# Patient Record
Sex: MALE | Race: WHITE | HISPANIC OR LATINO
[De-identification: names, ages, dates, MRNs, and addresses within clinical notes are randomized per-mention and may not be internally consistent; named-entity substitution may affect disease eponyms.]

---

## 2017-05-11 ENCOUNTER — LABORATORY RESULT (OUTPATIENT)
Age: 62
End: 2017-05-11

## 2017-05-11 ENCOUNTER — APPOINTMENT (OUTPATIENT)
Dept: HEART AND VASCULAR | Facility: CLINIC | Age: 62
End: 2017-05-11

## 2017-05-11 VITALS — SYSTOLIC BLOOD PRESSURE: 140 MMHG | DIASTOLIC BLOOD PRESSURE: 80 MMHG | RESPIRATION RATE: 16 BRPM | HEART RATE: 88 BPM

## 2017-05-11 VITALS — BODY MASS INDEX: 25.77 KG/M2 | WEIGHT: 180 LBS | HEIGHT: 70 IN

## 2017-05-11 DIAGNOSIS — E11.9 TYPE 2 DIABETES MELLITUS W/OUT COMPLICATIONS: ICD-10-CM

## 2017-05-11 DIAGNOSIS — I20.0 UNSTABLE ANGINA: ICD-10-CM

## 2017-05-11 DIAGNOSIS — Z87.891 PERSONAL HISTORY OF NICOTINE DEPENDENCE: ICD-10-CM

## 2017-05-11 DIAGNOSIS — I10 ESSENTIAL (PRIMARY) HYPERTENSION: ICD-10-CM

## 2017-05-11 RX ORDER — ASPIRIN 81 MG
81 TABLET, DELAYED RELEASE (ENTERIC COATED) ORAL
Refills: 0 | Status: ACTIVE | COMMUNITY

## 2017-05-11 RX ORDER — METOPROLOL SUCCINATE 25 MG/1
25 TABLET, EXTENDED RELEASE ORAL
Refills: 0 | Status: ACTIVE | COMMUNITY

## 2017-05-11 RX ORDER — INSULIN ASPART 100 [IU]/ML
100 INJECTION, SOLUTION INTRAVENOUS; SUBCUTANEOUS
Refills: 0 | Status: ACTIVE | COMMUNITY

## 2017-05-11 RX ORDER — INSULIN GLARGINE 100 [IU]/ML
100 INJECTION, SOLUTION SUBCUTANEOUS
Refills: 0 | Status: ACTIVE | COMMUNITY

## 2017-05-12 VITALS
WEIGHT: 178.57 LBS | DIASTOLIC BLOOD PRESSURE: 79 MMHG | SYSTOLIC BLOOD PRESSURE: 113 MMHG | RESPIRATION RATE: 18 BRPM | HEART RATE: 84 BPM | OXYGEN SATURATION: 98 % | HEIGHT: 70 IN

## 2017-05-12 LAB
ALBUMIN SERPL ELPH-MCNC: 3.6 G/DL
ALP BLD-CCNC: 171 U/L
ALT SERPL-CCNC: 85 U/L
ANION GAP SERPL CALC-SCNC: 13 MMOL/L
AST SERPL-CCNC: 109 U/L
BASOPHILS # BLD AUTO: 0.03 K/UL
BASOPHILS NFR BLD AUTO: 0.9 %
BILIRUB SERPL-MCNC: 1.8 MG/DL
BUN SERPL-MCNC: 20 MG/DL
CALCIUM SERPL-MCNC: 9.2 MG/DL
CHLORIDE SERPL-SCNC: 104 MMOL/L
CHOLEST SERPL-MCNC: 191 MG/DL
CHOLEST/HDLC SERPL: 3 RATIO
CO2 SERPL-SCNC: 21 MMOL/L
CREAT SERPL-MCNC: 0.79 MG/DL
EOSINOPHIL # BLD AUTO: 0.3 K/UL
EOSINOPHIL NFR BLD AUTO: 9.3 %
GLUCOSE SERPL-MCNC: 179 MG/DL
HCT VFR BLD CALC: 44.2 %
HDLC SERPL-MCNC: 62 MG/DL
HGB BLD-MCNC: 15.2 G/DL
IMM GRANULOCYTES NFR BLD AUTO: 0.3 %
INR PPP: 1.14 RATIO
LDLC SERPL CALC-MCNC: 104 MG/DL
LYMPHOCYTES # BLD AUTO: 0.92 K/UL
LYMPHOCYTES NFR BLD AUTO: 28.5 %
MAN DIFF?: NORMAL
MCHC RBC-ENTMCNC: 30.6 PG
MCHC RBC-ENTMCNC: 34.4 GM/DL
MCV RBC AUTO: 88.9 FL
MONOCYTES # BLD AUTO: 0.52 K/UL
MONOCYTES NFR BLD AUTO: 16.1 %
NEUTROPHILS # BLD AUTO: 1.45 K/UL
NEUTROPHILS NFR BLD AUTO: 44.9 %
PLATELET # BLD AUTO: 94 K/UL
POTASSIUM SERPL-SCNC: 4.3 MMOL/L
PROT SERPL-MCNC: 7.2 G/DL
PT BLD: 12.9 SEC
RBC # BLD: 4.97 M/UL
RBC # FLD: 16 %
SODIUM SERPL-SCNC: 138 MMOL/L
TRIGL SERPL-MCNC: 124 MG/DL
WBC # FLD AUTO: 3.23 K/UL

## 2017-05-12 RX ORDER — INSULIN GLARGINE 100 [IU]/ML
0 INJECTION, SOLUTION SUBCUTANEOUS
Qty: 0 | Refills: 0 | COMMUNITY

## 2017-05-12 RX ORDER — INSULIN ASPART 100 [IU]/ML
0 INJECTION, SOLUTION SUBCUTANEOUS
Qty: 0 | Refills: 0 | COMMUNITY

## 2017-05-12 RX ORDER — INSULIN ASPART 100 [IU]/ML
15 INJECTION, SOLUTION SUBCUTANEOUS
Qty: 0 | Refills: 0 | COMMUNITY

## 2017-05-12 RX ORDER — METOPROLOL TARTRATE 50 MG
1 TABLET ORAL
Qty: 0 | Refills: 0 | COMMUNITY

## 2017-05-12 NOTE — H&P ADULT - HISTORY OF PRESENT ILLNESS
61 y o m with FMH of CAD with PMHs of HTN, DM, prior CAD, s/p CABG over 7 years ago (report to be obtained) comes c/o recurrent chest pains that comes spontaneously  and is described a heaviness that radiates to his back. As per MD note, the pain has been waking him at night. Pt denies SOB, PND/orthopnea, palpitations or syncope.     In light of pt's risk factors, prior CAD and CCS 4 anginal symptoms, pt is now referred to [facility] for cardiac cath with possible intervention to rule out underlying CAD. 61 y o m HEPATITIS C INFECTED former smoker (quit 20 y ago, 1 PPD) with FMH of CAD with PMHs of HTN, DM, prior CAD, s/p CABG over 7 years ago in [facility] (report to be obtained) comes c/o recurrent chest pains that comes spontaneously  and is described a heaviness that radiates to his back.The pain has been waking him at night. Pt claims that he was feeling nauseous in the last couple of days. Pt denies SOB, PND/orthopnea, palpitations or syncope, fever/chills.     In light of pt's risk factors, prior CAD and CCS 4 anginal symptoms, pt is now referred to [facility] for cardiac cath with possible intervention to rule out underlying CAD. 61 y o m HEPATITIS C INFECTED former smoker (quit 20 y ago, 1 PPD) with FMH of CAD with PMHs of HTN, DM, prior CAD, s/p CABG over 7 years ago in [facility] (report to be obtained) comes c/o recurrent chest pains that comes spontaneously  and is described a heaviness that radiates to his back.The pain has been waking him at night. Pt claims that he was feeling nauseous in the last couple of days. Pt denies SOB, PND/orthopnea, palpitations or syncope, fever/chills. Pt denies any recent ECHO or NST done.     In light of pt's risk factors, prior CAD and CCS 4 anginal symptoms, pt is now referred to [facility] for cardiac cath with possible intervention to rule out underlying CAD. 62 y/o m HEPATITIS C INFECTED former smoker (quit 20 y ago, 1 PPD) with FMH of CAD with PMHs of HTN, DM, prior CAD, s/p CABG over 7 years ago in [facility] (report to be obtained) comes c/o recurrent chest pains that comes spontaneously  and is described a heaviness that radiates to his back.The pain has been waking him at night. Pt claims that he was feeling nauseous in the last couple of days. Pt denies SOB, PND/orthopnea, palpitations or syncope, fever/chills. Pt denies any recent ECHO or NST done.     In light of pt's risk factors, prior CAD and CCS 4 anginal symptoms, pt is now referred to [facility] for cardiac cath with possible intervention to rule out underlying CAD. 60 y/o m HEPATITIS C INFECTED former smoker (quit 20 y ago, 1 PPD) with FMH of CAD with PMHs of HTN, DM, prior CAD, s/p CABGx2 (LIMA-> LAD, SVG-> OM1, SVG-> D1) @ [facility] 05/09/2008 comes c/o recurrent chest pains that comes spontaneously  and is described a heaviness that radiates to his back.The pain has been waking him at night. Pt claims that he was feeling nauseous in the last couple of days. Pt denies SOB, PND/orthopnea, palpitations or syncope, fever/chills. Pt denies any recent ECHO or NST done.     In light of pt's risk factors, prior CAD and CCS 4 anginal symptoms, pt is now referred to [facility] for cardiac cath with possible intervention to rule out underlying CAD.

## 2017-05-12 NOTE — H&P ADULT - FAMILY HISTORY
Mother  Still living? Unknown  Family history of heart attack, Age at diagnosis: Age Unknown  Family history of diabetes mellitus, Age at diagnosis: Age Unknown

## 2017-05-12 NOTE — H&P ADULT - PMH
CAD (coronary artery disease) of artery bypass graft    Diabetes    Hypertension CAD (coronary artery disease) of artery bypass graft    Diabetes    Hepatitis C    Hypertension

## 2017-05-12 NOTE — H&P ADULT - NSHPSOCIALHISTORY_GEN_ALL_CORE
Pt quit smoking (20 years ago. 1PPD), pt denies using ETOH or doing any types of recreational drugs.

## 2017-05-12 NOTE — H&P ADULT - NSHPLABSRESULTS_GEN_ALL_CORE
EKG- NSR @84 BPM EKG- NSR @84 BPM                              14.7   2.1   )-----------( 87       ( 15 May 2017 09:13 )             42.0       05-15    139  |  108  |  20  ----------------------------<  310<H>  4.0   |  26  |  0.65    Ca    8.4<L>      15 May 2017 09:13    TPro  7.1  /  Alb  3.0<L>  /  TBili  1.4<H>  /  DBili  x   /  AST  106<H>  /  ALT  96<H>  /  AlkPhos  169<H>  05-15          CARDIAC MARKERS ( 15 May 2017 09:13 )  x     / x     / 68 U/L / x     / <1.0 ng/mL

## 2017-05-12 NOTE — H&P ADULT - ASSESSMENT
62 y/o m HEPATITIS C INFECTED former smoker (quit 20 y ago, 1 PPD) with FMH of CAD with PMHs of HTN, DM, prior CAD, s/p CABG over 7 years ago in [facility] (report to be obtained) with CCS 4 anginal symptoms, who is now referred to [facility] for cardiac cath with possible intervention to rule out underlying CAD.    Pt took his ASA 81mg PO at home.     ASA 3, Mallampati 2 60 y/o m HEPATITIS C INFECTED former smoker (quit 20 y ago, 1 PPD) with FMH of CAD with PMHs of HTN, DM, prior CAD, s/p CABG over 7 years ago in [facility] (report to be obtained) with CCS 4 anginal symptoms, who is now referred to [facility] for cardiac cath with possible intervention to rule out underlying CAD.    Pt took his ASA 81mg PO at home. Platelets are 87 today, secondary to hepatic impairment (Hep C). Dr. Ramos is aware. As discussed with Dr. Ramos, ASA 243mg PO and Plavix 600mg PO given prior to cardiac cath.    ASA 3, Mallampati 2 62 y/o m HEPATITIS C INFECTED former smoker (quit 20 y ago, 1 PPD) with FMH of CAD with PMHs of HTN, DM, prior CAD, s/p CABG over 7 years ago in [facility] (report to be obtained) with CCS 4 anginal symptoms, who is now referred to [facility] for cardiac cath with possible intervention to rule out underlying CAD.    Pt took his ASA 81mg PO at home. Platelets are 87 today, and LFTs are elevated (, ALT  96, AlkPhos 169) secondary to hepatic impairment (Hep C). Dr. Ramos is aware. As discussed with Dr. Ramos, ASA 243mg PO and Plavix 600mg PO given prior to cardiac cath.      ASA 3, Mallampati 2

## 2017-05-15 ENCOUNTER — INPATIENT (INPATIENT)
Facility: HOSPITAL | Age: 62
LOS: 0 days | Discharge: HOME | DRG: 247 | End: 2017-05-16
Attending: INTERNAL MEDICINE | Admitting: INTERNAL MEDICINE
Payer: COMMERCIAL

## 2017-05-15 DIAGNOSIS — Z95.1 PRESENCE OF AORTOCORONARY BYPASS GRAFT: Chronic | ICD-10-CM

## 2017-05-15 LAB
ALBUMIN SERPL ELPH-MCNC: 3 G/DL — LOW (ref 3.4–5)
ALP SERPL-CCNC: 169 U/L — HIGH (ref 40–120)
ALT FLD-CCNC: 96 U/L — HIGH (ref 12–42)
ANION GAP SERPL CALC-SCNC: 5 MMOL/L — LOW (ref 9–16)
APTT BLD: 34.3 SEC — SIGNIFICANT CHANGE UP (ref 27.5–37.4)
AST SERPL-CCNC: 106 U/L — HIGH (ref 15–37)
BASOPHILS NFR BLD AUTO: 0.5 % — SIGNIFICANT CHANGE UP (ref 0–2)
BILIRUB SERPL-MCNC: 1.4 MG/DL — HIGH (ref 0.2–1.2)
BUN SERPL-MCNC: 20 MG/DL — SIGNIFICANT CHANGE UP (ref 7–23)
CALCIUM SERPL-MCNC: 8.4 MG/DL — LOW (ref 8.5–10.5)
CHLORIDE SERPL-SCNC: 108 MMOL/L — SIGNIFICANT CHANGE UP (ref 96–108)
CHOLEST SERPL-MCNC: 175 MG/DL — SIGNIFICANT CHANGE UP
CK MB CFR SERPL CALC: <1 NG/ML — SIGNIFICANT CHANGE UP (ref 0.5–3.6)
CK SERPL-CCNC: 68 U/L — SIGNIFICANT CHANGE UP (ref 39–308)
CO2 SERPL-SCNC: 26 MMOL/L — SIGNIFICANT CHANGE UP (ref 22–31)
CREAT SERPL-MCNC: 0.65 MG/DL — SIGNIFICANT CHANGE UP (ref 0.5–1.3)
CRP SERPL-MCNC: 0.71 MG/DL — HIGH
EOSINOPHIL NFR BLD AUTO: 7.7 % — HIGH (ref 0–6)
GLUCOSE SERPL-MCNC: 310 MG/DL — HIGH (ref 70–99)
HBA1C BLD-MCNC: 8.1 % — HIGH (ref 4.8–5.6)
HCT VFR BLD CALC: 42 % — SIGNIFICANT CHANGE UP (ref 39–50)
HDLC SERPL-MCNC: 56 MG/DL — SIGNIFICANT CHANGE UP
HGB BLD-MCNC: 14.7 G/DL — SIGNIFICANT CHANGE UP (ref 13–17)
INR BLD: 1.31 — HIGH (ref 0.88–1.16)
LIPID PNL WITH DIRECT LDL SERPL: 104 MG/DL — HIGH
LYMPHOCYTES # BLD AUTO: 21.7 % — SIGNIFICANT CHANGE UP (ref 13–44)
MCHC RBC-ENTMCNC: 30.3 PG — SIGNIFICANT CHANGE UP (ref 27–34)
MCHC RBC-ENTMCNC: 35 G/DL — SIGNIFICANT CHANGE UP (ref 32–36)
MCV RBC AUTO: 86.6 FL — SIGNIFICANT CHANGE UP (ref 80–100)
MONOCYTES NFR BLD AUTO: 15.9 % — HIGH (ref 2–14)
NEUTROPHILS NFR BLD AUTO: 54.2 % — SIGNIFICANT CHANGE UP (ref 43–77)
PLATELET # BLD AUTO: 87 K/UL — LOW (ref 150–400)
POTASSIUM SERPL-MCNC: 4 MMOL/L — SIGNIFICANT CHANGE UP (ref 3.5–5.3)
POTASSIUM SERPL-SCNC: 4 MMOL/L — SIGNIFICANT CHANGE UP (ref 3.5–5.3)
PROT SERPL-MCNC: 7.1 G/DL — SIGNIFICANT CHANGE UP (ref 6.4–8.2)
PROTHROM AB SERPL-ACNC: 14.6 SEC — HIGH (ref 9.8–12.7)
RBC # BLD: 4.85 M/UL — SIGNIFICANT CHANGE UP (ref 4.2–5.8)
RBC # FLD: 15.1 % — SIGNIFICANT CHANGE UP (ref 10.3–16.9)
SODIUM SERPL-SCNC: 139 MMOL/L — SIGNIFICANT CHANGE UP (ref 135–145)
TOTAL CHOLESTEROL/HDL RATIO MEASUREMENT: 3.1 RATIO — SIGNIFICANT CHANGE UP
TRIGL SERPL-MCNC: 75 MG/DL — SIGNIFICANT CHANGE UP
WBC # BLD: 2.1 K/UL — LOW (ref 3.8–10.5)
WBC # FLD AUTO: 2.1 K/UL — LOW (ref 3.8–10.5)

## 2017-05-15 PROCEDURE — 93010 ELECTROCARDIOGRAM REPORT: CPT

## 2017-05-15 PROCEDURE — 93459 L HRT ART/GRFT ANGIO: CPT | Mod: 26,XU

## 2017-05-15 PROCEDURE — 92928 PRQ TCAT PLMT NTRAC ST 1 LES: CPT | Mod: LD

## 2017-05-15 RX ORDER — DEXTROSE 50 % IN WATER 50 %
25 SYRINGE (ML) INTRAVENOUS ONCE
Qty: 0 | Refills: 0 | Status: DISCONTINUED | OUTPATIENT
Start: 2017-05-15 | End: 2017-05-16

## 2017-05-15 RX ORDER — ASPIRIN/CALCIUM CARB/MAGNESIUM 324 MG
243 TABLET ORAL ONCE
Qty: 0 | Refills: 0 | Status: COMPLETED | OUTPATIENT
Start: 2017-05-15 | End: 2017-05-15

## 2017-05-15 RX ORDER — CLOPIDOGREL BISULFATE 75 MG/1
600 TABLET, FILM COATED ORAL ONCE
Qty: 0 | Refills: 0 | Status: COMPLETED | OUTPATIENT
Start: 2017-05-15 | End: 2017-05-15

## 2017-05-15 RX ORDER — SODIUM CHLORIDE 9 MG/ML
1000 INJECTION, SOLUTION INTRAVENOUS
Qty: 0 | Refills: 0 | Status: DISCONTINUED | OUTPATIENT
Start: 2017-05-15 | End: 2017-05-15

## 2017-05-15 RX ORDER — GLUCAGON INJECTION, SOLUTION 0.5 MG/.1ML
1 INJECTION, SOLUTION SUBCUTANEOUS ONCE
Qty: 0 | Refills: 0 | Status: DISCONTINUED | OUTPATIENT
Start: 2017-05-15 | End: 2017-05-16

## 2017-05-15 RX ORDER — DEXTROSE 50 % IN WATER 50 %
1 SYRINGE (ML) INTRAVENOUS ONCE
Qty: 0 | Refills: 0 | Status: DISCONTINUED | OUTPATIENT
Start: 2017-05-15 | End: 2017-05-16

## 2017-05-15 RX ORDER — INSULIN LISPRO 100/ML
VIAL (ML) SUBCUTANEOUS
Qty: 0 | Refills: 0 | Status: DISCONTINUED | OUTPATIENT
Start: 2017-05-15 | End: 2017-05-16

## 2017-05-15 RX ORDER — INSULIN GLARGINE 100 [IU]/ML
15 INJECTION, SOLUTION SUBCUTANEOUS AT BEDTIME
Qty: 0 | Refills: 0 | Status: DISCONTINUED | OUTPATIENT
Start: 2017-05-15 | End: 2017-05-16

## 2017-05-15 RX ORDER — CLOPIDOGREL BISULFATE 75 MG/1
75 TABLET, FILM COATED ORAL DAILY
Qty: 0 | Refills: 0 | Status: DISCONTINUED | OUTPATIENT
Start: 2017-05-16 | End: 2017-05-16

## 2017-05-15 RX ORDER — SODIUM CHLORIDE 9 MG/ML
500 INJECTION INTRAMUSCULAR; INTRAVENOUS; SUBCUTANEOUS
Qty: 0 | Refills: 0 | Status: DISCONTINUED | OUTPATIENT
Start: 2017-05-15 | End: 2017-05-15

## 2017-05-15 RX ORDER — INSULIN LISPRO 100/ML
VIAL (ML) SUBCUTANEOUS ONCE
Qty: 0 | Refills: 0 | Status: COMPLETED | OUTPATIENT
Start: 2017-05-15 | End: 2017-05-15

## 2017-05-15 RX ORDER — SODIUM CHLORIDE 9 MG/ML
500 INJECTION INTRAMUSCULAR; INTRAVENOUS; SUBCUTANEOUS
Qty: 0 | Refills: 0 | Status: DISCONTINUED | OUTPATIENT
Start: 2017-05-15 | End: 2017-05-16

## 2017-05-15 RX ORDER — METOPROLOL TARTRATE 50 MG
25 TABLET ORAL DAILY
Qty: 0 | Refills: 0 | Status: DISCONTINUED | OUTPATIENT
Start: 2017-05-15 | End: 2017-05-16

## 2017-05-15 RX ORDER — DEXTROSE 50 % IN WATER 50 %
12.5 SYRINGE (ML) INTRAVENOUS ONCE
Qty: 0 | Refills: 0 | Status: DISCONTINUED | OUTPATIENT
Start: 2017-05-15 | End: 2017-05-16

## 2017-05-15 RX ORDER — SODIUM CHLORIDE 9 MG/ML
1000 INJECTION, SOLUTION INTRAVENOUS
Qty: 0 | Refills: 0 | Status: DISCONTINUED | OUTPATIENT
Start: 2017-05-15 | End: 2017-05-16

## 2017-05-15 RX ORDER — ASPIRIN/CALCIUM CARB/MAGNESIUM 324 MG
81 TABLET ORAL DAILY
Qty: 0 | Refills: 0 | Status: DISCONTINUED | OUTPATIENT
Start: 2017-05-16 | End: 2017-05-16

## 2017-05-15 RX ADMIN — INSULIN GLARGINE 15 UNIT(S): 100 INJECTION, SOLUTION SUBCUTANEOUS at 21:59

## 2017-05-15 RX ADMIN — SODIUM CHLORIDE 75 MILLILITER(S): 9 INJECTION INTRAMUSCULAR; INTRAVENOUS; SUBCUTANEOUS at 12:48

## 2017-05-15 RX ADMIN — Medication 243 MILLIGRAM(S): at 10:03

## 2017-05-15 RX ADMIN — Medication 1: at 21:59

## 2017-05-15 RX ADMIN — CLOPIDOGREL BISULFATE 600 MILLIGRAM(S): 75 TABLET, FILM COATED ORAL at 10:02

## 2017-05-15 RX ADMIN — Medication 1: at 16:50

## 2017-05-15 NOTE — PROGRESS NOTE ADULT - SUBJECTIVE AND OBJECTIVE BOX
Procedure: LHC, JC of pLAD, Perclose  Indication: UA, CAD  Complication: none    Result:  1) Three vessel CAD (80% pLAD, 100% D1, 100% pLCX, 50% pRCA, 50% RPDA)  2) Atretic LIMA to LAD  3) Patent SVG's to D1 & OM1  4) Normal LV function, EF 55%, LVEDP 5  5) Successful JC of pLAD    Plan: Admit given ACS presentation and thrombocytopenia secondary to Hepatitis C. Plavix + ASA x 3-6 months.  To f/u with me in 2 weeks.

## 2017-05-16 ENCOUNTER — TRANSCRIPTION ENCOUNTER (OUTPATIENT)
Age: 62
End: 2017-05-16

## 2017-05-16 VITALS — TEMPERATURE: 98 F

## 2017-05-16 LAB
ANION GAP SERPL CALC-SCNC: 7 MMOL/L — LOW (ref 9–16)
BUN SERPL-MCNC: 15 MG/DL — SIGNIFICANT CHANGE UP (ref 7–23)
CALCIUM SERPL-MCNC: 8.1 MG/DL — LOW (ref 8.5–10.5)
CHLORIDE SERPL-SCNC: 106 MMOL/L — SIGNIFICANT CHANGE UP (ref 96–108)
CO2 SERPL-SCNC: 27 MMOL/L — SIGNIFICANT CHANGE UP (ref 22–31)
CREAT SERPL-MCNC: 0.68 MG/DL — SIGNIFICANT CHANGE UP (ref 0.5–1.3)
GLUCOSE SERPL-MCNC: 147 MG/DL — HIGH (ref 70–99)
HCT VFR BLD CALC: 41.7 % — SIGNIFICANT CHANGE UP (ref 39–50)
HGB BLD-MCNC: 14.3 G/DL — SIGNIFICANT CHANGE UP (ref 13–17)
MAGNESIUM SERPL-MCNC: 1.7 MG/DL — SIGNIFICANT CHANGE UP (ref 1.6–2.6)
MCHC RBC-ENTMCNC: 29.7 PG — SIGNIFICANT CHANGE UP (ref 27–34)
MCHC RBC-ENTMCNC: 34.3 G/DL — SIGNIFICANT CHANGE UP (ref 32–36)
MCV RBC AUTO: 86.7 FL — SIGNIFICANT CHANGE UP (ref 80–100)
PLATELET # BLD AUTO: 74 K/UL — LOW (ref 150–400)
POTASSIUM SERPL-MCNC: 3.8 MMOL/L — SIGNIFICANT CHANGE UP (ref 3.5–5.3)
POTASSIUM SERPL-SCNC: 3.8 MMOL/L — SIGNIFICANT CHANGE UP (ref 3.5–5.3)
RBC # BLD: 4.81 M/UL — SIGNIFICANT CHANGE UP (ref 4.2–5.8)
RBC # FLD: 15.4 % — SIGNIFICANT CHANGE UP (ref 10.3–16.9)
SODIUM SERPL-SCNC: 140 MMOL/L — SIGNIFICANT CHANGE UP (ref 135–145)
WBC # BLD: 2.5 K/UL — LOW (ref 3.8–10.5)
WBC # FLD AUTO: 2.5 K/UL — LOW (ref 3.8–10.5)

## 2017-05-16 PROCEDURE — 36415 COLL VENOUS BLD VENIPUNCTURE: CPT

## 2017-05-16 PROCEDURE — 82550 ASSAY OF CK (CPK): CPT

## 2017-05-16 PROCEDURE — 82553 CREATINE MB FRACTION: CPT

## 2017-05-16 PROCEDURE — 86140 C-REACTIVE PROTEIN: CPT

## 2017-05-16 PROCEDURE — C1760: CPT

## 2017-05-16 PROCEDURE — 80061 LIPID PANEL: CPT

## 2017-05-16 PROCEDURE — C1889: CPT

## 2017-05-16 PROCEDURE — 80048 BASIC METABOLIC PNL TOTAL CA: CPT

## 2017-05-16 PROCEDURE — 85027 COMPLETE CBC AUTOMATED: CPT

## 2017-05-16 PROCEDURE — C1894: CPT

## 2017-05-16 PROCEDURE — 83036 HEMOGLOBIN GLYCOSYLATED A1C: CPT

## 2017-05-16 PROCEDURE — C1887: CPT

## 2017-05-16 PROCEDURE — C1769: CPT

## 2017-05-16 PROCEDURE — 93005 ELECTROCARDIOGRAM TRACING: CPT

## 2017-05-16 PROCEDURE — 85610 PROTHROMBIN TIME: CPT

## 2017-05-16 PROCEDURE — 83735 ASSAY OF MAGNESIUM: CPT

## 2017-05-16 PROCEDURE — 93010 ELECTROCARDIOGRAM REPORT: CPT

## 2017-05-16 PROCEDURE — C1874: CPT

## 2017-05-16 PROCEDURE — 85025 COMPLETE CBC W/AUTO DIFF WBC: CPT

## 2017-05-16 PROCEDURE — 85730 THROMBOPLASTIN TIME PARTIAL: CPT

## 2017-05-16 PROCEDURE — 80053 COMPREHEN METABOLIC PANEL: CPT

## 2017-05-16 RX ORDER — CLOPIDOGREL BISULFATE 75 MG/1
1 TABLET, FILM COATED ORAL
Qty: 30 | Refills: 11 | OUTPATIENT
Start: 2017-05-16 | End: 2018-05-10

## 2017-05-16 RX ORDER — MAGNESIUM OXIDE 400 MG ORAL TABLET 241.3 MG
400 TABLET ORAL ONCE
Qty: 0 | Refills: 0 | Status: COMPLETED | OUTPATIENT
Start: 2017-05-16 | End: 2017-05-16

## 2017-05-16 RX ORDER — ASPIRIN/CALCIUM CARB/MAGNESIUM 324 MG
1 TABLET ORAL
Qty: 30 | Refills: 11 | OUTPATIENT
Start: 2017-05-16 | End: 2018-05-10

## 2017-05-16 RX ORDER — POTASSIUM CHLORIDE 20 MEQ
20 PACKET (EA) ORAL ONCE
Qty: 0 | Refills: 0 | Status: COMPLETED | OUTPATIENT
Start: 2017-05-16 | End: 2017-05-16

## 2017-05-16 RX ORDER — ASPIRIN/CALCIUM CARB/MAGNESIUM 324 MG
1 TABLET ORAL
Qty: 0 | Refills: 0 | COMMUNITY

## 2017-05-16 RX ORDER — ACETAMINOPHEN 500 MG
650 TABLET ORAL ONCE
Qty: 0 | Refills: 0 | Status: COMPLETED | OUTPATIENT
Start: 2017-05-16 | End: 2017-05-16

## 2017-05-16 RX ORDER — INSULIN GLARGINE 100 [IU]/ML
15 INJECTION, SOLUTION SUBCUTANEOUS
Qty: 0 | Refills: 0 | COMMUNITY

## 2017-05-16 RX ADMIN — CLOPIDOGREL BISULFATE 75 MILLIGRAM(S): 75 TABLET, FILM COATED ORAL at 08:33

## 2017-05-16 RX ADMIN — Medication 650 MILLIGRAM(S): at 09:30

## 2017-05-16 RX ADMIN — Medication 20 MILLIEQUIVALENT(S): at 08:33

## 2017-05-16 RX ADMIN — Medication 25 MILLIGRAM(S): at 05:44

## 2017-05-16 RX ADMIN — MAGNESIUM OXIDE 400 MG ORAL TABLET 400 MILLIGRAM(S): 241.3 TABLET ORAL at 08:33

## 2017-05-16 RX ADMIN — Medication 650 MILLIGRAM(S): at 08:33

## 2017-05-16 RX ADMIN — Medication 81 MILLIGRAM(S): at 08:33

## 2017-05-16 NOTE — CONSULT NOTE ADULT - SUBJECTIVE AND OBJECTIVE BOX
CHIEF COMPLAINT: CAD    HISTORY OF PRESENT ILLNESS:  60 y/o m  with HEPATITIS C (with thrombocytopnea and low wbc count),  former smoker (quit 20 y ago, 1 PPD) with FMH of CAD with PMHx of HTN, DM, prior CAD, s/p CABG over 7yrs ago with CCS 4 anginal symptoms and referred to [facility] for cardiac cath.  He is now s/p Cath 5/15/17 revealing 3vCAD (80% pLAD, 100% D1, 100% pLCX, 50% pRCA, 50% RPDA) with occluded LIMA -LAD, patent SVG-Cx and SVG-OM, with Successful JC of pLAD, EF 55%, LVEDP 5, Right groin perclose.  Right groin acces site remained stable with no bleeding, hematoma, and pulses intact.  Patient will continue ASA/Plavix for at least 3-6mo as per Dr. Ramos.  No Statin initiated secondary to history of Hepatitic C and AST,ALT, Alk phos elevated.  He will follow up with Dr. Ramos.       PAST MEDICAL & SURGICAL HISTORY:  Hepatitis C  CAD (coronary artery disease) of artery bypass graft  Diabetes  Hypertension  S/P CABG (coronary artery bypass graft)    FAMILY HISTORY:   Family history of diabetes mellitus (Mother)  Family history of heart attack (Mother)    ALLERGIES: NKDA    HOME MEDICATIONS:  · 	aspirin 81 mg oral tablet: 1 tab(s) orally once a day, Last Dose Taken:    · 	Metoprolol Succinate ER 25 mg oral tablet, extended release: 1 tab(s) orally once a day, Last Dose Taken:    · 	Lantus 100 units/mL subcutaneous solution: 15 unit(s) subcutaneous once a day (at bedtime), Last Dose Taken:    · 	NovoLOG 100 units/mL subcutaneous solution: 15 unit(s) subcutaneous once a day, Last Dose Taken:        REVIEW OF SYSTEMS:  CONSTITUTIONAL: No fever, weight loss, or fatigue  NEUROLOGICAL: No headaches, memory loss, loss of strength, numbness, or tremors  PSYCHIATRIC: No depression, anxiety, mood swings, or difficulty sleeping  RESPIRATORY: No cough, wheezing, chills or hemoptysis; No Shortness of Breath  CARDIOVASCULAR: No chest pain, palpitations, passing out, dizziness, or leg swelling  GASTROINTESTINAL: No abdominal or epigastric pain. No nausea, vomiting, or hematemesis; No diarrhea or constipation. No melena or hematochezia.  SKIN: No itching, burning, rashes, or lesions   MUSCULOSKELETAL: No joint pain or swelling; No muscle, back, or extremity pain	    PHYSICAL EXAM:  T(C): 36.6, Max: 37.3 (05-15 @ 21:24)  T(F): 97.9, Max: 99.1 (05-15 @ 21:24)  HR: 72 (72 - 80)  BP: 129/84 (124/77 - 140/74)  RR: 16 (16 - 16)  SpO2: 98% (96% - 99%)  Height (cm): 177.8 (05-15 @ 09:08)  Weight (kg): 81 (05-15 @ 09:08)  BMI (kg/m2): 25.6 (05-15 @ 09:08)    Appearance: Normal	  Neurologic: Non-focal  Psychiatric: AxOx3, normal mood and affect  HEENT:   Normal oral mucosa, PERRL, EOMI	  Lymphatic: No lymphadenopathy  Cardiovascular: Normal S1 S2, No JVD, No murmurs, No edema  Respiratory: Lungs clear to auscultation	  Gastrointestinal:  Soft, Non-tender, + BS	  Skin: No rashes, No ecchymoses, No cyanosis	  Extremities: Normal range of motion, No clubbing, cyanosis or edema  Vascular: Peripheral pulses palpable  	  LABS:                        14.3   2.5   )-----------( 74       ( 16 May 2017 05:55 )             41.7     05-16    140  |  106  |  15  ----------------------------<  147<H>  3.8   |  27  |  0.68    Ca    8.1<L>      16 May 2017 05:55  Mg     1.7     05-16    TPro  7.1  /  Alb  3.0<L>  /  TBili  1.4<H>  /  DBili  x   /  AST  106<H>  /  ALT  96<H>  /  AlkPhos  169<H>  05-15          Cholesterol, Serum: 175 mg/dL (05-15 @ 09:13)  HDL Cholesterol, Serum: 56 mg/dL (05-15 @ 09:13)  Triglycerides, Serum: 75 mg/dL (05-15 @ 09:13)  Direct LDL: 104 mg/dL (05-15 @ 09:13)    C-Reactive Protein, Serum: 0.71 mg/dL (05-15 @ 09:13)    Hemoglobin A1C, Whole Blood: 8.1 % (05-15 @ 09:13)      10 "S" ASSESSMENT PLAN: SMOKING, SITTING, SUGAR, SALT, SOME FATS, SOCIAL, SLEEP, SIGNS, AND MEDS:  Tobacco usage: He is a former smoker. He quit 20 years ago.   Stress: He rates his stress level as moderate. He says he tends to be anxious. He takes walks to help ease anxiety.   ETOH: He stopped drinking 20 years ago.   Caffeine: He has one coffee in the morning with milk and Sweet n' Low.   Hormone Replacement: He denies hormone therapy.   Sleep Disorder: He snores some but feels rested upon waking.   Inflammatory Condition: + Hep C  Activity Level: He is active in his job as a  but does not exercise regularly.   Current Diet: Breakfast) cereal with milk or a ham, egg, and cheese sandwich on a roll. Lunch) chilli, burgers and fries, or spaghetti and meatballs. Dinner) steak, pork chops, potatoes, and veggies. Snacks) cake.   Heart Failure: Denies.   Myopathy with Statins: + elevated LFT's due to Hep C infection   GI/ Issues: + acid reflux. Denies EGD.     ASSESSMENT/RECOMMENDATIONS: 	  Summary: 60 y/o m  with HEPATITIS C (with thrombocytopnea and low wbc count),  former smoker (quit 20 y ago, 1 PPD) with FMH of CAD with PMHx of HTN, DM, prior CAD, s/p CABG over 7yrs ago with CCS 4 anginal symptoms and referred to [facility] for cardiac cath.  He is now s/p Cath 5/15/17 revealing 3vCAD (80% pLAD, 100% D1, 100% pLCX, 50% pRCA, 50% RPDA) with occluded LIMA -LAD, patent SVG-Cx and SVG-OM, with Successful JC of pLAD, EF 55%, LVEDP 5, Right groin perclose.  Right groin acces site remained stable with no bleeding, hematoma, and pulses intact.  Patient will continue ASA/Plavix for at least 3-6mo as per Dr. Ramos.  No Statin initiated secondary to history of Hepatitic C and AST,ALT, Alk phos elevated.  He will follow up with Dr. Ramos.         RECOMMENDATIONS:   Anti-platelet Therapy: DAPT per interventionalist recommendation with asa/Plavix.   Lipid Therapy: This patient would likely benefit from PCSK9I therapy as his LFTs are high related to Hep C infection.   Beta Blocker Therapy: Continue current therapy with metoprolol per your discretion.   ACE/ARB Therapy: ACE/ARB therapy might be considered for renal protection.   Diet: Low-sodium, low-carbohydrate, Mediterranean diet. Written instruction on the Mediterranean diet was provided.   Exercise: 30-45 minutes most days of the week once cleared to do so by their referring cardiologist.   Smoking: This patient is a non-smoker.   Stress Management: Instruction on mindfulness meditation was provided.   Sleep: A sleep study might benefit this patient.   Other: Patient reports acid reflux. We discussed ways to reduce acid with diet. If symptoms continue, referral to GI might be considered.     Thank you for the opportunity to see this patient. Please feel free to contact [facility] if there are any questions, or if you feel that your patient would benefit from continued follow-up visits with the Program.

## 2017-05-16 NOTE — DISCHARGE NOTE ADULT - CARE PLAN
Principal Discharge DX:	CAD (coronary artery disease) of artery bypass graft  Goal:	Please follow up with Dr. Ramos in 2 weeks, please call the office to make an appointment.  Instructions for follow-up, activity and diet:	You underwent a cardiac catheterization on 5/15 revealing a closed prior bypass graft and you received a stent to your proximal left anterior descending artery.  - Please START taking aspirin 81mg daily and Plavix 75mg daily.  DO NOT STOP THESE MEDICATIONS AS THEY PREVENT YOUR STENT FROM CLOSING.  You will likely continue plavix for 6months, follow up with your cardiologist to determine when to stop.    You underwent a coronary angiogram and should wait 3 days before returning to ordinary activities. Do not drive for 2 days. Consult your doctor before returning to vigorous activity. You may return to work in 3-5 days. The catheter from your groin was removed and you should remove the dressing in 24 hours. You may shower once the dressing is removed, but avoid baths, hot tubs, or swimming for 5 days to prevent infection. If you notice bleeding from the site, hardening and pain at the site, drainage or redness from the site, coolness/paleness of the extremity, swelling, or fever, please call [phone].  Secondary Diagnosis:	Hypertension  Goal:	Please continue to take metoprolol XL 25mg daily. maintain a low salt diet.  Secondary Diagnosis:	Diabetes  Goal:	Please continue your diabetes medication as usual Principal Discharge DX:	CAD (coronary artery disease) of artery bypass graft  Goal:	Please follow up with Dr. Ramos in 2 weeks, please call the office to make an appointment.  Instructions for follow-up, activity and diet:	You underwent a cardiac catheterization on 5/15 revealing a closed prior bypass graft and you received a stent to your proximal left anterior descending artery.  - Please START taking aspirin 81mg daily and Plavix 75mg daily.  DO NOT STOP THESE MEDICATIONS AS THEY PREVENT YOUR STENT FROM CLOSING.  You will likely continue plavix for 6months, follow up with your cardiologist to determine when to stop.    You underwent a coronary angiogram and should wait 3 days before returning to ordinary activities. Do not drive for 2 days. Consult your doctor before returning to vigorous activity. You may return to work in 3-5 days. The catheter from your groin was removed and you should remove the dressing in 24 hours. You may shower once the dressing is removed, but avoid baths, hot tubs, or swimming for 5 days to prevent infection. If you notice bleeding from the site, hardening and pain at the site, drainage or redness from the site, coolness/paleness of the extremity, swelling, or fever, please call [phone].  Secondary Diagnosis:	Hypertension  Goal:	Please continue to take metoprolol XL 25mg daily. maintain a low salt diet.  Secondary Diagnosis:	Diabetes  Goal:	Please continue your diabetes medication as usual Principal Discharge DX:	CAD (coronary artery disease) of artery bypass graft  Goal:	Please follow up with Dr. Ramos in 2 weeks, please call the office to make an appointment.  Instructions for follow-up, activity and diet:	You underwent a cardiac catheterization on 5/15 revealing a closed prior bypass graft and you received a stent to your proximal left anterior descending artery.  - Please START taking aspirin 81mg daily and Plavix 75mg daily.  DO NOT STOP THESE MEDICATIONS AS THEY PREVENT YOUR STENT FROM CLOSING.  You will likely continue plavix for 6months, follow up with your cardiologist to determine when to stop.    You underwent a coronary angiogram and should wait 3 days before returning to ordinary activities. Do not drive for 2 days. Consult your doctor before returning to vigorous activity. You may return to work in 3-5 days. The catheter from your groin was removed and you should remove the dressing in 24 hours. You may shower once the dressing is removed, but avoid baths, hot tubs, or swimming for 5 days to prevent infection. If you notice bleeding from the site, hardening and pain at the site, drainage or redness from the site, coolness/paleness of the extremity, swelling, or fever, please call [phone].  Secondary Diagnosis:	Hypertension  Goal:	Please continue to take metoprolol XL 25mg daily. maintain a low salt diet.  Secondary Diagnosis:	Diabetes  Goal:	Please continue your diabetes medication as usual

## 2017-05-16 NOTE — DISCHARGE NOTE ADULT - HOSPITAL COURSE
62 y/o m  with HEPATITIS C (with thrombocytopnea and low wbc count),  former smoker (quit 20 y ago, 1 PPD) with FMH of CAD with PMHx of HTN, DM, prior CAD, s/p CABG over 7yrs ago with CCS 4 anginal symptoms and referred to [facility] for cardiac cath.  He is now s/p Cath 5/15/17 revealing 3vCAD (80% pLAD, 100% D1, 100% pLCX, 50% pRCA, 50% RPDA) with occluded LIMA -LAD, patent SVG-Cx and SVG-OM, with Successful JC of pLAD, EF 55%, LVEDP 5, Right groin perclose.  Right groin acces site remained stable with no bleeding, hematoma, and pulses intact.  Patient will continue ASA/Plavix for at least 3-6mo as per Dr. Ramos.  No Statin initiated secondary to history of Hepatitic C and AST,ALT, Alk phos elevated.  Patient feeling well.  labs, vitals, Meds reviewed with dr. Peralta and patient deemed stable for discharge home.  He will follow up with Dr. Ramos.  New prescriptions E prescribed to pharmacy of choice.

## 2017-05-16 NOTE — DISCHARGE NOTE ADULT - CARE PROVIDER_API CALL
Craig Ramos), Cardiovascular Disease; Interventional Cardiology  [address] [place] [address]  Phone: [phone]  Fax: [phone]

## 2017-05-16 NOTE — DISCHARGE NOTE ADULT - MEDICATION SUMMARY - MEDICATIONS TO TAKE
I will START or STAY ON the medications listed below when I get home from the hospital:    aspirin 81 mg oral tablet  -- 1 tab(s) by mouth once a day  -- Indication: For Coronary artery disease, new prescription    NovoLOG 100 units/mL subcutaneous solution  -- 15 unit(s) subcutaneous once a day  -- Indication: For Diabetes    clopidogrel 75 mg oral tablet  -- 1 tab(s) by mouth once a day  -- Indication: For Coronary artery disease, new prescritpion    Metoprolol Succinate ER 25 mg oral tablet, extended release  -- 1 tab(s) by mouth once a day  -- Indication: For High blood pressure and heart rate

## 2017-05-16 NOTE — DISCHARGE NOTE ADULT - PATIENT PORTAL LINK FT
“You can access the [facility] Patient Portal, offered by [facility], by registering with the following website: [email or web address]”

## 2017-05-16 NOTE — DISCHARGE NOTE ADULT - PLAN OF CARE
Please follow up with Dr. Ramos in 2 weeks, please call the office to make an appointment. You underwent a cardiac catheterization on 5/15 revealing a closed prior bypass graft and you received a stent to your proximal left anterior descending artery.  - Please START taking aspirin 81mg daily and Plavix 75mg daily.  DO NOT STOP THESE MEDICATIONS AS THEY PREVENT YOUR STENT FROM CLOSING.  You will likely continue plavix for 6months, follow up with your cardiologist to determine when to stop.    You underwent a coronary angiogram and should wait 3 days before returning to ordinary activities. Do not drive for 2 days. Consult your doctor before returning to vigorous activity. You may return to work in 3-5 days. The catheter from your groin was removed and you should remove the dressing in 24 hours. You may shower once the dressing is removed, but avoid baths, hot tubs, or swimming for 5 days to prevent infection. If you notice bleeding from the site, hardening and pain at the site, drainage or redness from the site, coolness/paleness of the extremity, swelling, or fever, please call [phone]. Please continue to take metoprolol XL 25mg daily. maintain a low salt diet. Please continue your diabetes medication as usual

## 2017-05-18 DIAGNOSIS — Z95.1 PRESENCE OF AORTOCORONARY BYPASS GRAFT: ICD-10-CM

## 2017-05-18 DIAGNOSIS — R07.9 CHEST PAIN, UNSPECIFIED: ICD-10-CM

## 2017-05-18 DIAGNOSIS — I25.10 ATHEROSCLEROTIC HEART DISEASE OF NATIVE CORONARY ARTERY WITHOUT ANGINA PECTORIS: ICD-10-CM

## 2017-05-18 DIAGNOSIS — Z87.891 PERSONAL HISTORY OF NICOTINE DEPENDENCE: ICD-10-CM

## 2017-05-18 DIAGNOSIS — E11.9 TYPE 2 DIABETES MELLITUS WITHOUT COMPLICATIONS: ICD-10-CM

## 2017-05-18 DIAGNOSIS — I10 ESSENTIAL (PRIMARY) HYPERTENSION: ICD-10-CM

## 2017-05-18 DIAGNOSIS — B19.20 UNSPECIFIED VIRAL HEPATITIS C WITHOUT HEPATIC COMA: ICD-10-CM

## 2017-05-19 PROBLEM — E11.9 TYPE 2 DIABETES MELLITUS WITHOUT COMPLICATIONS: Chronic | Status: ACTIVE | Noted: 2017-05-12

## 2017-05-19 PROBLEM — B19.20 UNSPECIFIED VIRAL HEPATITIS C WITHOUT HEPATIC COMA: Chronic | Status: ACTIVE | Noted: 2017-05-12

## 2017-05-19 PROBLEM — I10 ESSENTIAL (PRIMARY) HYPERTENSION: Chronic | Status: ACTIVE | Noted: 2017-05-12

## 2017-05-19 PROBLEM — I25.810 ATHEROSCLEROSIS OF CORONARY ARTERY BYPASS GRAFT(S) WITHOUT ANGINA PECTORIS: Chronic | Status: ACTIVE | Noted: 2017-05-12

## 2017-05-22 DIAGNOSIS — I25.110 ATHEROSCLEROTIC HEART DISEASE OF NATIVE CORONARY ARTERY WITH UNSTABLE ANGINA PECTORIS: ICD-10-CM

## 2017-05-22 DIAGNOSIS — I25.82 CHRONIC TOTAL OCCLUSION OF CORONARY ARTERY: ICD-10-CM

## 2017-05-22 DIAGNOSIS — D69.6 THROMBOCYTOPENIA, UNSPECIFIED: ICD-10-CM

## 2017-06-01 ENCOUNTER — APPOINTMENT (OUTPATIENT)
Dept: HEART AND VASCULAR | Facility: CLINIC | Age: 62
End: 2017-06-01

## 2018-08-13 ENCOUNTER — APPOINTMENT (OUTPATIENT)
Dept: PULMONOLOGY | Facility: CLINIC | Age: 63
End: 2018-08-13